# Patient Record
Sex: FEMALE | Race: WHITE | NOT HISPANIC OR LATINO | URBAN - METROPOLITAN AREA
[De-identification: names, ages, dates, MRNs, and addresses within clinical notes are randomized per-mention and may not be internally consistent; named-entity substitution may affect disease eponyms.]

---

## 2020-06-29 ENCOUNTER — TELEPHONE ENCOUNTER (OUTPATIENT)
Dept: URBAN - METROPOLITAN AREA CLINIC 92 | Facility: CLINIC | Age: 50
End: 2020-06-29

## 2020-06-29 RX ORDER — SPIRONOLACTONE 50 MG/1
1 TABLET TABLET, FILM COATED ORAL ONCE A DAY
Qty: 90 TABLET | Refills: 3
Start: 2020-01-17 | End: 2021-01-11

## 2020-07-14 ENCOUNTER — TELEPHONE ENCOUNTER (OUTPATIENT)
Dept: URBAN - METROPOLITAN AREA CLINIC 92 | Facility: CLINIC | Age: 50
End: 2020-07-14

## 2020-07-14 RX ORDER — NADOLOL 20 MG/1
1 TABLET TABLET ORAL ONCE A DAY
Qty: 90 | Refills: 3
Start: 2020-07-14

## 2020-07-14 RX ORDER — SPIRONOLACTONE 50 MG/1
1 TABLET TABLET, FILM COATED ORAL ONCE A DAY
Qty: 90 TABLET | Refills: 3 | Status: ACTIVE | COMMUNITY
Start: 2020-01-17 | End: 2021-01-11

## 2020-12-01 ENCOUNTER — TELEPHONE ENCOUNTER (OUTPATIENT)
Dept: URBAN - METROPOLITAN AREA CLINIC 23 | Facility: CLINIC | Age: 50
End: 2020-12-01

## 2020-12-01 RX ORDER — NADOLOL 20 MG/1
1 TABLET TABLET ORAL ONCE A DAY
Qty: 90 TABLET | Refills: 3
Start: 2020-12-01

## 2020-12-01 RX ORDER — SPIRONOLACTONE 50 MG/1
1 TABLET TABLET, FILM COATED ORAL ONCE A DAY
Qty: 90 TABLET | Refills: 3
Start: 2020-12-01 | End: 2021-11-26

## 2022-03-04 ENCOUNTER — OFFICE VISIT (OUTPATIENT)
Dept: URBAN - METROPOLITAN AREA CLINIC 23 | Facility: CLINIC | Age: 52
End: 2022-03-04

## 2023-08-31 ENCOUNTER — CLAIMS CREATED FROM THE CLAIM WINDOW (OUTPATIENT)
Dept: URBAN - METROPOLITAN AREA CLINIC 54 | Facility: CLINIC | Age: 53
End: 2023-08-31
Payer: COMMERCIAL

## 2023-08-31 ENCOUNTER — LAB OUTSIDE AN ENCOUNTER (OUTPATIENT)
Dept: URBAN - METROPOLITAN AREA CLINIC 54 | Facility: CLINIC | Age: 53
End: 2023-08-31

## 2023-08-31 VITALS
WEIGHT: 161 LBS | BODY MASS INDEX: 25.88 KG/M2 | HEART RATE: 60 BPM | HEIGHT: 66 IN | SYSTOLIC BLOOD PRESSURE: 113 MMHG | TEMPERATURE: 97.4 F | DIASTOLIC BLOOD PRESSURE: 63 MMHG

## 2023-08-31 DIAGNOSIS — R18.8 OTHER ASCITES: ICD-10-CM

## 2023-08-31 DIAGNOSIS — E66.9 CLASS 1 OBESITY: ICD-10-CM

## 2023-08-31 DIAGNOSIS — K76.6 PORTAL HYPERTENSION: ICD-10-CM

## 2023-08-31 DIAGNOSIS — I85.10 ESOPH VARICE OTHER DIS: ICD-10-CM

## 2023-08-31 DIAGNOSIS — K70.30 ALCOHOLIC CIRRHOSIS OF LIVER WITHOUT ASCITES: ICD-10-CM

## 2023-08-31 DIAGNOSIS — I85.00 ESOPHAGEAL VARICES DETERMINED BY ENDOSCOPY: ICD-10-CM

## 2023-08-31 DIAGNOSIS — K76.82 PORTOSYSTEMIC ENCEPHALOPATHY: ICD-10-CM

## 2023-08-31 PROBLEM — 420054005: Status: ACTIVE | Noted: 2023-08-31

## 2023-08-31 PROBLEM — 443371000124107: Status: ACTIVE | Noted: 2023-08-31

## 2023-08-31 PROBLEM — 28670008: Status: ACTIVE | Noted: 2023-08-31

## 2023-08-31 PROBLEM — 34742003: Status: ACTIVE | Noted: 2023-08-31

## 2023-08-31 PROBLEM — 389026000: Status: ACTIVE | Noted: 2023-08-31

## 2023-08-31 PROCEDURE — 99204 OFFICE O/P NEW MOD 45 MIN: CPT | Performed by: INTERNAL MEDICINE

## 2023-08-31 PROCEDURE — 99244 OFF/OP CNSLTJ NEW/EST MOD 40: CPT | Performed by: INTERNAL MEDICINE

## 2023-08-31 RX ORDER — SPIRONOLACTONE 50 MG/1
1 TABLET TABLET, FILM COATED ORAL ONCE A DAY
Status: ACTIVE | COMMUNITY

## 2023-08-31 RX ORDER — ARIPIPRAZOLE 10 MG/1
1 TABLET TABLET ORAL ONCE A DAY
Status: ACTIVE | COMMUNITY

## 2023-08-31 RX ORDER — BUPROPION HYDROCHLORIDE 150 MG/1
1 TABLET IN THE MORNING TABLET, FILM COATED, EXTENDED RELEASE ORAL ONCE A DAY
Status: ACTIVE | COMMUNITY

## 2023-08-31 RX ORDER — TRAZODONE HYDROCHLORIDE 100 MG/1
1 TABLET AT BEDTIME TABLET ORAL ONCE A DAY
Status: ACTIVE | COMMUNITY

## 2023-08-31 RX ORDER — DESVENLAFAXINE SUCCINATE 100 MG/1
1 TABLET TABLET, EXTENDED RELEASE ORAL ONCE A DAY
Status: ACTIVE | COMMUNITY

## 2023-08-31 NOTE — HPI-TODAY'S VISIT:
Patient is a 53 yo woman referred by Dr. Viola Jenkins for above reasons. A copy of this document will be sent to referring provider. She carries a diagnosis of Alcoholic cirrhosis since 2016. She has portal hypertension manifested as bleeding esophageal varices s/p EBL and eradication in 7928-1280, ascites s/p LVP with none needed for > 5 year, PSE not on lactulose currently. She has not had liver related care since 2020. She is abstinent x 6 years. She is a non-smoker. She has no metabolic risk factors. She is on Abilify, Trazodone and Pristiq for depression. She has good appetite and sleep. She works full time for [employer]. She was recently seen by Dr. 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 No recent liver imaging on file. She has good QoL.

## 2023-09-02 LAB
A/G RATIO: 1.7
ABSOLUTE BASOPHILS: 8
ABSOLUTE EOSINOPHILS: 90
ABSOLUTE LYMPHOCYTES: 955
ABSOLUTE MONOCYTES: 230
ABSOLUTE NEUTROPHILS: 2817
AFP, SERUM, TUMOR MARKER: 2.6
ALBUMIN: 4.5
ALKALINE PHOSPHATASE: 68
ALT (SGPT): 12
AST (SGOT): 21
BASOPHILS: 0.2
BILIRUBIN, TOTAL: 0.9
BUN/CREATININE RATIO: (no result)
BUN: 9
CALCIUM: 9.5
CARBON DIOXIDE, TOTAL: 30
CHLORIDE: 105
COMMENT(S): (no result)
CREATININE: 0.86
EGFR: 80
EOSINOPHILS: 2.2
GLOBULIN, TOTAL: 2.6
GLUCOSE: 85
HEMATOCRIT: 38.1
HEMOGLOBIN: 13.1
HEPATITIS C ANTIBODY: (no result)
INR: 1.1
LYMPHOCYTES: 23.3
MCH: 30.8
MCHC: 34.4
MCV: 89.4
MONOCYTES: 5.6
MPV: 10.9
NEUTROPHILS: 68.7
PLATELET COUNT: 55
POTASSIUM: 4
PROTEIN, TOTAL: 7.1
PT: 11.5
RDW: 13.6
RED BLOOD CELL COUNT: 4.26
SODIUM: 141
WHITE BLOOD CELL COUNT: 4.1

## 2024-01-12 ENCOUNTER — OFFICE VISIT (OUTPATIENT)
Dept: URBAN - METROPOLITAN AREA CLINIC 54 | Facility: CLINIC | Age: 54
End: 2024-01-12

## 2024-01-19 ENCOUNTER — OFFICE VISIT (OUTPATIENT)
Dept: URBAN - METROPOLITAN AREA CLINIC 54 | Facility: CLINIC | Age: 54
End: 2024-01-19

## 2024-01-23 ENCOUNTER — DASHBOARD ENCOUNTERS (OUTPATIENT)
Age: 54
End: 2024-01-23

## 2024-01-23 ENCOUNTER — OFFICE VISIT (OUTPATIENT)
Dept: URBAN - METROPOLITAN AREA CLINIC 54 | Facility: CLINIC | Age: 54
End: 2024-01-23
Payer: COMMERCIAL

## 2024-01-23 ENCOUNTER — LAB OUTSIDE AN ENCOUNTER (OUTPATIENT)
Dept: URBAN - METROPOLITAN AREA CLINIC 54 | Facility: CLINIC | Age: 54
End: 2024-01-23

## 2024-01-23 VITALS
TEMPERATURE: 97.6 F | HEIGHT: 66 IN | BODY MASS INDEX: 26.2 KG/M2 | DIASTOLIC BLOOD PRESSURE: 69 MMHG | WEIGHT: 163 LBS | SYSTOLIC BLOOD PRESSURE: 134 MMHG | HEART RATE: 67 BPM

## 2024-01-23 DIAGNOSIS — K76.82 PORTOSYSTEMIC ENCEPHALOPATHY: ICD-10-CM

## 2024-01-23 DIAGNOSIS — K70.30 ALCOHOLIC CIRRHOSIS OF LIVER WITHOUT ASCITES: ICD-10-CM

## 2024-01-23 DIAGNOSIS — R18.8 OTHER ASCITES: ICD-10-CM

## 2024-01-23 DIAGNOSIS — K76.6 PORTAL HYPERTENSION: ICD-10-CM

## 2024-01-23 DIAGNOSIS — E66.9 CLASS 1 OBESITY: ICD-10-CM

## 2024-01-23 DIAGNOSIS — Z86.010 PERSONAL HISTORY OF COLONIC POLYPS: ICD-10-CM

## 2024-01-23 DIAGNOSIS — I85.00 ESOPHAGEAL VARICES DETERMINED BY ENDOSCOPY: ICD-10-CM

## 2024-01-23 PROBLEM — 428283002: Status: ACTIVE | Noted: 2024-01-23

## 2024-01-23 PROCEDURE — 99214 OFFICE O/P EST MOD 30 MIN: CPT | Performed by: PHYSICIAN ASSISTANT

## 2024-01-23 RX ORDER — BUPROPION HYDROCHLORIDE 150 MG/1
1 TABLET IN THE MORNING TABLET, FILM COATED, EXTENDED RELEASE ORAL ONCE A DAY
Status: ACTIVE | COMMUNITY

## 2024-01-23 RX ORDER — DESVENLAFAXINE SUCCINATE 100 MG/1
1 TABLET TABLET, EXTENDED RELEASE ORAL ONCE A DAY
Status: ACTIVE | COMMUNITY

## 2024-01-23 RX ORDER — ARIPIPRAZOLE 10 MG/1
1 TABLET TABLET ORAL ONCE A DAY
Status: ACTIVE | COMMUNITY

## 2024-01-23 RX ORDER — TRAZODONE HYDROCHLORIDE 100 MG/1
1 TABLET AT BEDTIME TABLET ORAL ONCE A DAY
Status: ACTIVE | COMMUNITY

## 2024-01-23 NOTE — HPI-TODAY'S VISIT:
Patient is a 53 yo woman referred by Dr. Viola Jenkins for above reasons. A copy of this document will be sent to referring provider. She carries a diagnosis of Alcoholic cirrhosis since 2016. She has portal hypertension manifested as bleeding esophageal varices s/p EBL and eradication in [id number], ascites s/p LVP with none needed for > 5 year, PSE not on lactulose currently. She has not had liver related care since 2020. She is abstinent x 6 years. She is a non-smoker. She has no metabolic risk factors. She is on Abilify, Trazodone and Pristiq for depression. She has good appetite and sleep. She works full time for [employer]. She was recently seen by Dr. 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 No recent liver imaging on file. She has good QoL.  1/23/24: Patient presents for routine follow up. MELD (3.0) from last OV was 8. No liver related complaints. Last colonoscopy was 2017 that was normal. Patient denies a history of colon polyps. However, with further chart review, it appears there was a colonoscopy in 2015 with six polyps removed and some adenomas polyps and others hyperplastic. Denies family hx of CRC. Patient denies rectal bleeding or other GI related complaints. Last EGD was 2018.

## 2024-01-24 LAB
A/G RATIO: 1.8
ABSOLUTE BASOPHILS: 30
ABSOLUTE EOSINOPHILS: 69
ABSOLUTE LYMPHOCYTES: 1023
ABSOLUTE MONOCYTES: 224
ABSOLUTE NEUTROPHILS: 2954
AFP, SERUM, TUMOR MARKER: 3.2
ALBUMIN: 4.4
ALKALINE PHOSPHATASE: 80
ALT (SGPT): 14
AST (SGOT): 23
BASOPHILS: 0.7
BILIRUBIN, TOTAL: 0.6
BUN/CREATININE RATIO: (no result)
BUN: 11
CALCIUM: 9.9
CARBON DIOXIDE, TOTAL: 27
CHLORIDE: 105
CREATININE: 0.93
EGFR: 73
EOSINOPHILS: 1.6
GLOBULIN, TOTAL: 2.4
GLUCOSE: 69
HEMATOCRIT: 37.9
HEMOGLOBIN: 12.6
INR: 1.1
LYMPHOCYTES: 23.8
MCH: 28.8
MCHC: 33.2
MCV: 86.5
MONOCYTES: 5.2
MPV: 11.2
NEUTROPHILS: 68.7
PLATELET COUNT: 61
POTASSIUM: 3.6
PROTEIN, TOTAL: 6.8
PT: 11.3
RDW: 13.9
RED BLOOD CELL COUNT: 4.38
SODIUM: 142
WHITE BLOOD CELL COUNT: 4.3

## 2024-02-07 ENCOUNTER — US (OUTPATIENT)
Dept: URBAN - METROPOLITAN AREA CLINIC 53 | Facility: CLINIC | Age: 54
End: 2024-02-07

## 2024-02-21 ENCOUNTER — US (OUTPATIENT)
Dept: URBAN - METROPOLITAN AREA CLINIC 53 | Facility: CLINIC | Age: 54
End: 2024-02-21
Payer: COMMERCIAL

## 2024-02-21 DIAGNOSIS — K74.69 OTHER CIRRHOSIS OF LIVER: ICD-10-CM

## 2024-02-21 DIAGNOSIS — K80.20 CHOLELITHIASIS: ICD-10-CM

## 2024-02-21 DIAGNOSIS — N20.0 KIDNEY CALCULUS: ICD-10-CM

## 2024-02-21 PROCEDURE — 76705 ECHO EXAM OF ABDOMEN: CPT | Performed by: INTERNAL MEDICINE

## 2024-07-22 ENCOUNTER — LAB OUTSIDE AN ENCOUNTER (OUTPATIENT)
Dept: URBAN - METROPOLITAN AREA CLINIC 54 | Facility: CLINIC | Age: 54
End: 2024-07-22

## 2024-07-22 ENCOUNTER — OFFICE VISIT (OUTPATIENT)
Dept: URBAN - METROPOLITAN AREA CLINIC 54 | Facility: CLINIC | Age: 54
End: 2024-07-22
Payer: COMMERCIAL

## 2024-07-22 VITALS
SYSTOLIC BLOOD PRESSURE: 126 MMHG | BODY MASS INDEX: 28.45 KG/M2 | DIASTOLIC BLOOD PRESSURE: 68 MMHG | TEMPERATURE: 97.6 F | WEIGHT: 177 LBS | HEIGHT: 66 IN | HEART RATE: 60 BPM

## 2024-07-22 DIAGNOSIS — K76.6 PORTAL HYPERTENSION: ICD-10-CM

## 2024-07-22 DIAGNOSIS — I85.10 ESOPH VARICE OTHER DIS: ICD-10-CM

## 2024-07-22 DIAGNOSIS — K70.30 ALCOHOLIC CIRRHOSIS OF LIVER WITHOUT ASCITES: ICD-10-CM

## 2024-07-22 DIAGNOSIS — R18.8 OTHER ASCITES: ICD-10-CM

## 2024-07-22 PROCEDURE — 99214 OFFICE O/P EST MOD 30 MIN: CPT | Performed by: INTERNAL MEDICINE

## 2024-07-22 RX ORDER — ARIPIPRAZOLE 10 MG/1
1 TABLET TABLET ORAL ONCE A DAY
Status: ACTIVE | COMMUNITY

## 2024-07-22 RX ORDER — TRAZODONE HYDROCHLORIDE 100 MG/1
1 TABLET AT BEDTIME TABLET ORAL ONCE A DAY
Status: ACTIVE | COMMUNITY

## 2024-07-22 RX ORDER — SOD SULF/POT CHLORIDE/MAG SULF 1.479 G
12 TABLETS THE FIRST DOSE THE EVENING BEFORE AND SECOND DOSE THE MORNING OF COLONOSCOPY TABLET ORAL TWICE A DAY
Qty: 24 | Refills: 0 | OUTPATIENT
Start: 2024-07-22 | End: 2024-07-23

## 2024-07-22 RX ORDER — DESVENLAFAXINE SUCCINATE 100 MG/1
1 TABLET TABLET, EXTENDED RELEASE ORAL ONCE A DAY
Status: ACTIVE | COMMUNITY

## 2024-07-22 NOTE — HPI-TODAY'S VISIT:
Patient is a 53 yo woman referred by Dr. Viola Jenkins for above reasons. A copy of this document will be sent to referring provider. She carries a diagnosis of Alcoholic cirrhosis since 2016. She has portal hypertension manifested as bleeding esophageal varices s/p EBL and eradication in 7237-1031, ascites s/p LVP with none needed for > 5 year, PSE not on lactulose currently. She has not had liver related care since 2020. She is abstinent x 6 years. She is a non-smoker. She has no metabolic risk factors. She is on Abilify, Trazodone and Pristiq for depression. She has good appetite and sleep. She works full time for [employer]. She was recently seen by Dr. 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 No recent liver imaging on file. She has good QoL.  1/23/24: Patient presents for routine follow up. MELD (3.0) from last OV was 8. No liver related complaints. Last colonoscopy was 2017 that was normal. Patient denies a history of colon polyps. However, with further chart review, it appears there was a colonoscopy in 2015 with six polyps removed and some adenomas polyps and others hyperplastic. Denies family hx of CRC. Patient denies rectal bleeding or other GI related complaints. Last EGD was 2018.   Follow [facility] 7/22/24: Patient presents for routine follow up. No liver related complaints. No signs of portal hypertension. HCC screen with USG negative in Feb 2024. She is due for EGD and Colonoscopy. No new complaints. To Whom it may concern,    COLTON GOMEZ was seen in clinic on 10/17/2018. Please take this into consideration for her time missed. She may return to work without restrictions on 10/18/2018.    Please note that HIPAA prevents patient information from being shared without written permission from the patient.    Thank you!            Sudeep Smith D.O.  10/17/2018  15:40       Electronically signed by:SUDEEP SMITH D.O.  Oct 17 2018  4:10PM CST

## 2024-07-23 LAB
A/G RATIO: 2.3
ABSOLUTE BASOPHILS: 29
ABSOLUTE EOSINOPHILS: 59
ABSOLUTE LYMPHOCYTES: 773
ABSOLUTE MONOCYTES: 281
ABSOLUTE NEUTROPHILS: 3058
AFP, SERUM, TUMOR MARKER: 2.1
ALBUMIN: 4.3
ALKALINE PHOSPHATASE: 92
ALT (SGPT): 14
AST (SGOT): 23
BASOPHILS: 0.7
BILIRUBIN, TOTAL: 0.6
BUN/CREATININE RATIO: (no result)
BUN: 10
CALCIUM: 9.1
CARBON DIOXIDE, TOTAL: 28
CHLORIDE: 104
COMMENT(S): (no result)
CREATININE: 0.86
EGFR: 80
EOSINOPHILS: 1.4
GLOBULIN, TOTAL: 1.9
GLUCOSE: 80
HEMATOCRIT: 34.6
HEMOGLOBIN: 11.7
INR: 1.1
LYMPHOCYTES: 18.4
MCH: 29.3
MCHC: 33.8
MCV: 86.7
MONOCYTES: 6.7
MPV: 10.8
NEUTROPHILS: 72.8
PLATELET COUNT: 54
POTASSIUM: 3.9
PROTEIN, TOTAL: 6.2
PT: 11.8
RDW: 14.6
RED BLOOD CELL COUNT: 3.99
SODIUM: 140
WHITE BLOOD CELL COUNT: 4.2

## 2024-08-21 ENCOUNTER — OFFICE VISIT (OUTPATIENT)
Dept: URBAN - METROPOLITAN AREA CLINIC 53 | Facility: CLINIC | Age: 54
End: 2024-08-21

## 2024-09-18 ENCOUNTER — OFFICE VISIT (OUTPATIENT)
Dept: URBAN - METROPOLITAN AREA CLINIC 53 | Facility: CLINIC | Age: 54
End: 2024-09-18

## 2024-10-02 ENCOUNTER — OFFICE VISIT (OUTPATIENT)
Dept: URBAN - METROPOLITAN AREA CLINIC 53 | Facility: CLINIC | Age: 54
End: 2024-10-02
Payer: COMMERCIAL

## 2024-10-02 DIAGNOSIS — N20.0 RENAL CALCULUS, RIGHT: ICD-10-CM

## 2024-10-02 DIAGNOSIS — K80.20 CHOLELITHIASIS: ICD-10-CM

## 2024-10-02 DIAGNOSIS — K70.30 ALCOHOLIC CIRRHOSIS: ICD-10-CM

## 2024-10-02 PROCEDURE — 76705 ECHO EXAM OF ABDOMEN: CPT | Performed by: INTERNAL MEDICINE

## 2024-10-11 ENCOUNTER — TELEPHONE ENCOUNTER (OUTPATIENT)
Dept: URBAN - METROPOLITAN AREA CLINIC 54 | Facility: CLINIC | Age: 54
End: 2024-10-11

## 2024-11-21 ENCOUNTER — OFFICE VISIT (OUTPATIENT)
Dept: URBAN - METROPOLITAN AREA SURGERY CENTER 14 | Facility: SURGERY CENTER | Age: 54
End: 2024-11-21

## 2024-11-21 ENCOUNTER — TELEPHONE ENCOUNTER (OUTPATIENT)
Dept: URBAN - METROPOLITAN AREA CLINIC 54 | Facility: CLINIC | Age: 54
End: 2024-11-21

## 2024-11-21 ENCOUNTER — CLAIMS CREATED FROM THE CLAIM WINDOW (OUTPATIENT)
Dept: URBAN - METROPOLITAN AREA SURGERY CENTER 14 | Facility: SURGERY CENTER | Age: 54
End: 2024-11-21
Payer: COMMERCIAL

## 2024-11-21 DIAGNOSIS — I85.10 ESOPHAGEAL VARICES: ICD-10-CM

## 2024-11-21 DIAGNOSIS — Z12.11 COLON CANCER SCREENING: ICD-10-CM

## 2024-11-21 DIAGNOSIS — K31.89 OTHER DISEASES OF STOMACH AND DUODENUM: ICD-10-CM

## 2024-11-21 DIAGNOSIS — K57.30 COLONIC DIVERTICULOSIS: ICD-10-CM

## 2024-11-21 PROCEDURE — 00813 ANES UPR LWR GI NDSC PX: CPT | Performed by: NURSE ANESTHETIST, CERTIFIED REGISTERED

## 2024-11-21 RX ORDER — CARVEDILOL 3.12 MG/1
1 TABLET WITH FOOD TABLET, FILM COATED ORAL TWICE A DAY
Qty: 60 | OUTPATIENT
Start: 2024-11-21

## 2024-11-21 RX ORDER — DESVENLAFAXINE SUCCINATE 100 MG/1
1 TABLET TABLET, EXTENDED RELEASE ORAL ONCE A DAY
Status: ACTIVE | COMMUNITY

## 2024-11-21 RX ORDER — TRAZODONE HYDROCHLORIDE 100 MG/1
1 TABLET AT BEDTIME TABLET ORAL ONCE A DAY
Status: ACTIVE | COMMUNITY

## 2024-11-21 RX ORDER — ARIPIPRAZOLE 10 MG/1
1 TABLET TABLET ORAL ONCE A DAY
Status: ACTIVE | COMMUNITY

## 2024-12-03 ENCOUNTER — OFFICE VISIT (OUTPATIENT)
Dept: URBAN - METROPOLITAN AREA SURGERY CENTER 14 | Facility: SURGERY CENTER | Age: 54
End: 2024-12-03

## 2024-12-30 ENCOUNTER — ERX REFILL RESPONSE (OUTPATIENT)
Dept: URBAN - METROPOLITAN AREA CLINIC 54 | Facility: CLINIC | Age: 54
End: 2024-12-30

## 2024-12-30 RX ORDER — CARVEDILOL 3.12 MG/1
TAKE 1 TABLET BY MOUTH TWICE A DAY WITH FOOD FOR 30 DAYS TABLET, FILM COATED ORAL
Qty: 60 TABLET | Refills: 0 | OUTPATIENT

## 2024-12-30 RX ORDER — CARVEDILOL 3.12 MG/1
1 TABLET WITH FOOD TABLET, FILM COATED ORAL TWICE A DAY
Qty: 60 | OUTPATIENT

## 2025-01-20 ENCOUNTER — OFFICE VISIT (OUTPATIENT)
Dept: URBAN - METROPOLITAN AREA CLINIC 54 | Facility: CLINIC | Age: 55
End: 2025-01-20

## 2025-01-24 ENCOUNTER — OFFICE VISIT (OUTPATIENT)
Dept: URBAN - METROPOLITAN AREA CLINIC 54 | Facility: CLINIC | Age: 55
End: 2025-01-24

## 2025-01-24 RX ORDER — TRAZODONE HYDROCHLORIDE 100 MG/1
1 TABLET AT BEDTIME TABLET ORAL ONCE A DAY
COMMUNITY

## 2025-01-24 RX ORDER — CARVEDILOL 3.12 MG/1
TAKE 1 TABLET BY MOUTH TWICE A DAY WITH FOOD FOR 30 DAYS TABLET, FILM COATED ORAL
Qty: 60 TABLET | Refills: 0 | COMMUNITY

## 2025-01-24 RX ORDER — ARIPIPRAZOLE 10 MG/1
1 TABLET TABLET ORAL ONCE A DAY
COMMUNITY

## 2025-01-24 RX ORDER — DESVENLAFAXINE SUCCINATE 100 MG/1
1 TABLET TABLET, EXTENDED RELEASE ORAL ONCE A DAY
COMMUNITY

## 2025-01-29 ENCOUNTER — ERX REFILL RESPONSE (OUTPATIENT)
Dept: URBAN - METROPOLITAN AREA CLINIC 54 | Facility: CLINIC | Age: 55
End: 2025-01-29

## 2025-01-29 RX ORDER — CARVEDILOL 3.12 MG/1
1 TABLET WITH FOOD TABLET, FILM COATED ORAL TWICE A DAY
Qty: 180 TABLET | Refills: 3 | OUTPATIENT

## 2025-01-29 RX ORDER — CARVEDILOL 3.12 MG/1
TAKE 1 TABLET BY MOUTH TWICE A DAY WITH FOOD FOR 30 DAYS TABLET, FILM COATED ORAL
Qty: 60 TABLET | Refills: 0 | OUTPATIENT

## 2025-01-30 ENCOUNTER — OFFICE VISIT (OUTPATIENT)
Dept: URBAN - METROPOLITAN AREA CLINIC 54 | Facility: CLINIC | Age: 55
End: 2025-01-30

## 2025-04-07 ENCOUNTER — OFFICE VISIT (OUTPATIENT)
Dept: URBAN - METROPOLITAN AREA CLINIC 54 | Facility: CLINIC | Age: 55
End: 2025-04-07

## 2025-08-19 ENCOUNTER — OFFICE VISIT (OUTPATIENT)
Dept: URBAN - METROPOLITAN AREA CLINIC 23 | Facility: CLINIC | Age: 55
End: 2025-08-19
Payer: COMMERCIAL

## 2025-08-19 ENCOUNTER — LAB OUTSIDE AN ENCOUNTER (OUTPATIENT)
Dept: URBAN - METROPOLITAN AREA CLINIC 23 | Facility: CLINIC | Age: 55
End: 2025-08-19

## 2025-08-19 DIAGNOSIS — K70.30 ALCOHOLIC CIRRHOSIS OF LIVER WITHOUT ASCITES: ICD-10-CM

## 2025-08-19 PROCEDURE — 99214 OFFICE O/P EST MOD 30 MIN: CPT

## 2025-08-19 RX ORDER — CARVEDILOL 3.12 MG/1
1 TABLET WITH FOOD TABLET, FILM COATED ORAL TWICE A DAY
Qty: 180 TABLET | Refills: 3 | Status: ACTIVE | COMMUNITY

## 2025-08-19 RX ORDER — POTASSIUM CHLORIDE 1500 MG/1
1 TABLET WITH FOOD TABLET, EXTENDED RELEASE ORAL ONCE A DAY
Qty: 30 | Status: ACTIVE | COMMUNITY

## 2025-08-26 ENCOUNTER — LAB OUTSIDE AN ENCOUNTER (OUTPATIENT)
Dept: URBAN - METROPOLITAN AREA CLINIC 23 | Facility: CLINIC | Age: 55
End: 2025-08-26

## 2025-08-26 ENCOUNTER — TELEPHONE ENCOUNTER (OUTPATIENT)
Dept: URBAN - METROPOLITAN AREA CLINIC 23 | Facility: CLINIC | Age: 55
End: 2025-08-26

## 2025-08-27 LAB
A/G RATIO: 2
ABSOLUTE BASOPHILS: 50
ABSOLUTE EOSINOPHILS: 298
ABSOLUTE LYMPHOCYTES: 1215
ABSOLUTE MONOCYTES: 422
ABSOLUTE NEUTROPHILS: 4216
AFP, SERUM: 2.3
AFP-L3%, SERUM: (no result)
ALBUMIN: 3.9
ALKALINE PHOSPHATASE: 67
ALT (SGPT): 23
AST (SGOT): 29
BASOPHILS: 0.8
BILIRUBIN, TOTAL: 1.3
BUN/CREATININE RATIO: (no result)
BUN: 12
CALCIUM: 9.1
CARBON DIOXIDE, TOTAL: 24
CHLORIDE: 109
CREATININE: 0.95
EGFR: 70
EOSINOPHILS: 4.8
GLOBULIN, TOTAL: 2
GLUCOSE: 95
HEMATOCRIT: 34.5
HEMOGLOBIN: 11.6
INR: 1.1
LYMPHOCYTES: 19.6
MCH: 32.1
MCHC: 33.6
MCV: 95.6
MONOCYTES: 6.8
MPV: 10.1
NEUTROPHILS: 68
PLATELET COUNT: 96
POTASSIUM: 4
PROTEIN, TOTAL: 5.9
PT: 11.5
RDW: 13.5
RED BLOOD CELL COUNT: 3.61
SODIUM: 139
WHITE BLOOD CELL COUNT: 6.2